# Patient Record
Sex: MALE | Race: WHITE | NOT HISPANIC OR LATINO | Employment: PART TIME | URBAN - METROPOLITAN AREA
[De-identification: names, ages, dates, MRNs, and addresses within clinical notes are randomized per-mention and may not be internally consistent; named-entity substitution may affect disease eponyms.]

---

## 2022-10-04 ENCOUNTER — OFFICE VISIT (OUTPATIENT)
Dept: URGENT CARE | Facility: PHYSICIAN GROUP | Age: 22
End: 2022-10-04
Payer: COMMERCIAL

## 2022-10-04 VITALS
SYSTOLIC BLOOD PRESSURE: 116 MMHG | TEMPERATURE: 97.3 F | HEIGHT: 77 IN | BODY MASS INDEX: 26.21 KG/M2 | DIASTOLIC BLOOD PRESSURE: 72 MMHG | RESPIRATION RATE: 18 BRPM | HEART RATE: 89 BPM | WEIGHT: 222 LBS | OXYGEN SATURATION: 99 %

## 2022-10-04 DIAGNOSIS — M62.830 BACK MUSCLE SPASM: ICD-10-CM

## 2022-10-04 PROCEDURE — 99203 OFFICE O/P NEW LOW 30 MIN: CPT | Performed by: FAMILY MEDICINE

## 2022-10-04 NOTE — PROGRESS NOTES
"  Subjective:      22 y.o. male presents to urgent care for back pain that started on Friday.  There is no inciting event or trauma at that time.  The pain was initially constant, but is improving and is now only intermittent, coming with certain movements, is described as dull, currently rated 5/10.  He has been using both Tylenol and ibuprofen with good relief in symptoms.  No numbness or weakness to lower extremities bilaterally.  No loss of bowel or bladder function.    Back pain red flags:  -Recent trauma: no  -Unexplained weight loss: no  -Neurologic symptoms: no  -Age >50 years old: no  -Fever: no  -IV drug use: no  -Steroid use: no  -History of cancer: no    He denies any other questions or concerns at this time.    Current problem list, medication, and past medical/surgical history were reviewed in Epic.    ROS  See HPI     Objective:      /72   Pulse 89   Temp 36.3 °C (97.3 °F)   Resp 18   Ht 1.956 m (6' 5\")   Wt 101 kg (222 lb)   SpO2 99%   BMI 26.33 kg/m²     Physical Exam  Constitutional:       General: He is not in acute distress.     Appearance: He is not diaphoretic.   Cardiovascular:      Rate and Rhythm: Normal rate and regular rhythm.      Heart sounds: Normal heart sounds.   Pulmonary:      Effort: Pulmonary effort is normal. No respiratory distress.      Breath sounds: Normal breath sounds.   Musculoskeletal:      Comments: No discolorations or deformities noted to inspection of back.  No step-offs or areas of tenderness to palpation of spine.  He is tender to palpation of his left thoracic, paraspinal muscles, no issues on the right   Neurological:      Mental Status: He is alert.      Comments: Equal strength and sensation to lower extremities bilaterally.  Normal gait.   Psychiatric:         Mood and Affect: Affect normal.         Judgment: Judgment normal.     Assessment/Plan:     1. Back muscle spasm  No red flags.  Pain is already improving.  Continue with heat, Tylenol, and " ibuprofen.  School note has been provided.      Instructed to return to Urgent Care or nearest Emergency Department if symptoms fail to improve, for any change in condition, further concerns, or new concerning symptoms. Patient states understanding of the plan of care and discharge instructions.    Verónica Beasley M.D.

## 2022-10-04 NOTE — LETTER
October 4, 2022    To Whom It May Concern:         This is confirmation that Giles Botello attended his scheduled appointment with Verónica Beasley M.D. on 10/04/22. He may return to school without any restrictions tomorrow.          If you have any questions please do not hesitate to call me at the phone number listed below.    Sincerely,          Verónica Beasley M.D.  [phone]

## 2022-10-25 ENCOUNTER — OFFICE VISIT (OUTPATIENT)
Dept: URGENT CARE | Facility: PHYSICIAN GROUP | Age: 22
End: 2022-10-25
Payer: COMMERCIAL

## 2022-10-25 VITALS
WEIGHT: 210 LBS | OXYGEN SATURATION: 97 % | TEMPERATURE: 97.8 F | HEIGHT: 77 IN | SYSTOLIC BLOOD PRESSURE: 118 MMHG | BODY MASS INDEX: 24.79 KG/M2 | RESPIRATION RATE: 14 BRPM | DIASTOLIC BLOOD PRESSURE: 72 MMHG | HEART RATE: 52 BPM

## 2022-10-25 DIAGNOSIS — J02.9 PHARYNGITIS, UNSPECIFIED ETIOLOGY: ICD-10-CM

## 2022-10-25 DIAGNOSIS — M54.50 ACUTE RIGHT-SIDED LOW BACK PAIN WITHOUT SCIATICA: ICD-10-CM

## 2022-10-25 PROCEDURE — 99213 OFFICE O/P EST LOW 20 MIN: CPT | Performed by: NURSE PRACTITIONER

## 2022-10-25 ASSESSMENT — ENCOUNTER SYMPTOMS
BACK PAIN: 1
SORE THROAT: 1
FEVER: 1
NEUROLOGICAL NEGATIVE: 1

## 2022-10-25 ASSESSMENT — VISUAL ACUITY: OU: 1

## 2022-10-25 NOTE — PROGRESS NOTES
"Subjective:     Giles Botello is a 22 y.o. male who presents for Medical Clearance (Needs a doctors note for work and school, was sick over the weekend )       Pharyngitis   This is a new problem. The problem has been gradually worsening.   Back Pain  Associated symptoms include a fever.     Patient reports over the weekend, he started to develop swollen tonsils, sore throat, and fever.  Symptoms improving at this time.  Did miss school and work and needs a note.    Reports chronic, recurrent lower back pain.  Has pain and tenderness at his right lower back.  Denies injury.    Review of Systems   Constitutional:  Positive for fever.   HENT:  Positive for sore throat.    Musculoskeletal:  Positive for back pain.   Neurological: Negative.    All other systems reviewed and are negative.    Refer to HPI for additional details.    During this visit, appropriate PPE was worn, hand hygiene was performed, and the patient and any visitors were masked.    PMH:  has no past medical history on file.    MEDS: No current outpatient medications on file.    ALLERGIES: No Known Allergies  SURGHX: History reviewed. No pertinent surgical history.  SOCHX:  reports that he has never smoked. He has never used smokeless tobacco. He reports current alcohol use. He reports that he does not use drugs.    FH: Per HPI as applicable/pertinent.      Objective:     /72   Pulse (!) 52   Temp 36.6 °C (97.8 °F) (Temporal)   Resp 14   Ht 1.956 m (6' 5\")   Wt 95.3 kg (210 lb)   SpO2 97%   BMI 24.90 kg/m²     Physical Exam  Nursing note reviewed.   Constitutional:       General: He is not in acute distress.     Appearance: He is well-developed. He is not ill-appearing or toxic-appearing.   HENT:      Mouth/Throat:      Mouth: Mucous membranes are moist.      Pharynx: Uvula midline. Pharyngeal swelling and posterior oropharyngeal erythema present.   Eyes:      General: Vision grossly intact.   Cardiovascular:      Rate and Rhythm: Normal " rate.   Pulmonary:      Effort: Pulmonary effort is normal. No respiratory distress.   Musculoskeletal:         General: No deformity. Normal range of motion.      Lumbar back: Spasms and tenderness (R paraspinous region) present. No deformity. Normal range of motion.   Skin:     General: Skin is warm and dry.      Coloration: Skin is not pale.   Neurological:      Mental Status: He is alert and oriented to person, place, and time.      Motor: No weakness.   Psychiatric:         Behavior: Behavior normal. Behavior is cooperative.       Assessment/Plan:     1. Pharyngitis, unspecified etiology    2. Acute right-sided low back pain without sciatica    Patient declines strep testing at this time.  Reports symptoms are improving.    Discussed OTC ibuprofen/NSAID. Warm salt water gargles PRN. Heat application to back pain.  Patient declines Rx for muscle relaxant.    Differential diagnosis, natural history, supportive care, rest, fluids, over-the-counter symptom management per 's instructions, close monitoring, and indications for immediate follow-up discussed.     All questions answered. Patient agrees with the plan of care.    Discharge summary provided.    Work note provided.    School note provided.

## 2022-10-25 NOTE — LETTER
October 25, 2022         Patient: Giles Botello   YOB: 2000   Date of Visit: 10/25/2022           To Whom it May Concern:    Giles Botello was seen in my clinic on 10/25/2022 due to illness. Due to medical necessity, please excuse patient from work 10/21/2022 through 10/24/2022.       If you have any questions or concerns, please don't hesitate to call.        Sincerely,           IJEOMA Soriano.  Electronically Signed

## 2022-10-25 NOTE — LETTER
October 25, 2022         Patient: Giles Botello   YOB: 2000   Date of Visit: 10/25/2022           To Whom it May Concern:    Giles Botello was seen in my clinic on 10/25/2022 due to illness. Due to medical necessity, please excuse patient from school 10/21/2022 through 10/24/2022.       If you have any questions or concerns, please don't hesitate to call.        Sincerely,         IJEOMA Soriano.  Electronically Signed

## 2024-01-29 ENCOUNTER — OFFICE VISIT (OUTPATIENT)
Dept: URGENT CARE | Facility: PHYSICIAN GROUP | Age: 24
End: 2024-01-29
Payer: COMMERCIAL

## 2024-01-29 VITALS
RESPIRATION RATE: 16 BRPM | HEART RATE: 71 BPM | OXYGEN SATURATION: 97 % | WEIGHT: 210 LBS | BODY MASS INDEX: 24.79 KG/M2 | TEMPERATURE: 98.1 F | DIASTOLIC BLOOD PRESSURE: 68 MMHG | HEIGHT: 77 IN | SYSTOLIC BLOOD PRESSURE: 114 MMHG

## 2024-01-29 DIAGNOSIS — R05.8 SPASMODIC COUGH: ICD-10-CM

## 2024-01-29 DIAGNOSIS — J01.90 ACUTE BACTERIAL SINUSITIS: ICD-10-CM

## 2024-01-29 DIAGNOSIS — B96.89 ACUTE BACTERIAL SINUSITIS: ICD-10-CM

## 2024-01-29 PROCEDURE — 3074F SYST BP LT 130 MM HG: CPT | Performed by: NURSE PRACTITIONER

## 2024-01-29 PROCEDURE — 3078F DIAST BP <80 MM HG: CPT | Performed by: NURSE PRACTITIONER

## 2024-01-29 PROCEDURE — 99213 OFFICE O/P EST LOW 20 MIN: CPT | Performed by: NURSE PRACTITIONER

## 2024-01-29 RX ORDER — DOXYCYCLINE HYCLATE 100 MG
100 TABLET ORAL 2 TIMES DAILY
Qty: 14 TABLET | Refills: 0 | Status: SHIPPED | OUTPATIENT
Start: 2024-01-29 | End: 2024-02-05

## 2024-01-29 RX ORDER — DEXTROMETHORPHAN HYDROBROMIDE AND PROMETHAZINE HYDROCHLORIDE 15; 6.25 MG/5ML; MG/5ML
5 SYRUP ORAL EVERY 4 HOURS PRN
Qty: 120 ML | Refills: 0 | Status: SHIPPED | OUTPATIENT
Start: 2024-01-29

## 2024-01-29 RX ORDER — PREDNISONE 20 MG/1
TABLET ORAL
Qty: 10 TABLET | Refills: 0 | Status: SHIPPED | OUTPATIENT
Start: 2024-01-29

## 2024-01-29 ASSESSMENT — ENCOUNTER SYMPTOMS
SPUTUM PRODUCTION: 1
SINUS PRESSURE: 1
COUGH: 1

## 2024-01-30 NOTE — PROGRESS NOTES
"Carolyn Botello is a 23 y.o. male who presents with Other (X1-2 weeks: Runny nose, cough that causes headache, green snot, sinus pressure. )            Sinus Problem  This is a new problem. Episode onset: pt reports new onset of cold symptoms that started about one month ago. he states he was recently on abx for 5 days, started to feel better but then all the sxs returned. he states his cough is worse at night. cough is productive of mucous. no fevers. Associated symptoms include congestion, coughing and sinus pressure. Past treatments include acetaminophen and oral decongestants. The treatment provided no relief.       Review of Systems   HENT:  Positive for congestion and sinus pressure.    Respiratory:  Positive for cough and sputum production.    All other systems reviewed and are negative.         History reviewed. No pertinent past medical history. History reviewed. No pertinent surgical history.   Social History     Socioeconomic History    Marital status: Single     Spouse name: Not on file    Number of children: Not on file    Years of education: Not on file    Highest education level: Not on file   Occupational History    Not on file   Tobacco Use    Smoking status: Never    Smokeless tobacco: Never   Vaping Use    Vaping Use: Never used   Substance and Sexual Activity    Alcohol use: Yes    Drug use: Never    Sexual activity: Not on file   Other Topics Concern    Not on file   Social History Narrative    Not on file     Social Determinants of Health     Financial Resource Strain: Not on file   Food Insecurity: Not on file   Transportation Needs: Not on file   Physical Activity: Not on file   Stress: Not on file   Social Connections: Not on file   Intimate Partner Violence: Not on file   Housing Stability: Not on file         Objective     /68   Pulse 71   Temp 36.7 °C (98.1 °F)   Resp 16   Ht 1.956 m (6' 5\")   Wt 95.3 kg (210 lb)   SpO2 97%   BMI 24.90 kg/m²      Physical " Exam  Vitals and nursing note reviewed.   Constitutional:       Appearance: Normal appearance.   HENT:      Head: Normocephalic and atraumatic.      Right Ear: Tympanic membrane and external ear normal.      Left Ear: Tympanic membrane and external ear normal.      Nose: Congestion present.      Mouth/Throat:      Mouth: Mucous membranes are moist.      Pharynx: Oropharynx is clear.   Eyes:      Extraocular Movements: Extraocular movements intact.      Pupils: Pupils are equal, round, and reactive to light.   Cardiovascular:      Rate and Rhythm: Normal rate and regular rhythm.   Pulmonary:      Effort: Pulmonary effort is normal.      Breath sounds: Normal breath sounds.      Comments: Bronchospastic cough  Musculoskeletal:         General: Normal range of motion.      Cervical back: Normal range of motion and neck supple.   Skin:     General: Skin is warm and dry.      Capillary Refill: Capillary refill takes less than 2 seconds.   Neurological:      General: No focal deficit present.      Mental Status: He is alert and oriented to person, place, and time. Mental status is at baseline.   Psychiatric:         Mood and Affect: Mood normal.         Thought Content: Thought content normal.         Judgment: Judgment normal.                             Assessment & Plan        1. Acute bacterial sinusitis  - doxycycline (VIBRAMYCIN) 100 MG Tab; Take 1 Tablet by mouth 2 times a day for 7 days.  Dispense: 14 Tablet; Refill: 0    2. Spasmodic cough  - predniSONE (DELTASONE) 20 MG Tab; Take 2 tabs PO daily for 5 days  Dispense: 10 Tablet; Refill: 0  - promethazine-dextromethorphan (PROMETHAZINE-DM) 6.25-15 MG/5ML syrup; Take 5 mL by mouth every four hours as needed for Cough.  Dispense: 120 mL; Refill: 0     Take full course of steroids and doxy as directed  Sedating effects of cough syrup discussed  Encouraged rest and fluids  Supportive care, differential diagnoses, and indications for immediate follow-up discussed with  patient.    Pathogenesis of diagnosis discussed including typical length and natural progression.    Instructed to return to UC or nearest emergency department if symptoms fail to improve, for any change in condition, further concerns, or new concerning symptoms.  Patient states understanding of the plan of care and discharge instructions.

## 2024-03-26 ENCOUNTER — OFFICE VISIT (OUTPATIENT)
Dept: URGENT CARE | Facility: PHYSICIAN GROUP | Age: 24
End: 2024-03-26
Payer: COMMERCIAL

## 2024-03-26 VITALS
HEART RATE: 76 BPM | BODY MASS INDEX: 24.32 KG/M2 | RESPIRATION RATE: 16 BRPM | DIASTOLIC BLOOD PRESSURE: 54 MMHG | HEIGHT: 77 IN | TEMPERATURE: 97.8 F | SYSTOLIC BLOOD PRESSURE: 114 MMHG | OXYGEN SATURATION: 96 % | WEIGHT: 206 LBS

## 2024-03-26 DIAGNOSIS — B96.89 ACUTE BACTERIAL SINUSITIS: ICD-10-CM

## 2024-03-26 DIAGNOSIS — J01.90 ACUTE BACTERIAL SINUSITIS: ICD-10-CM

## 2024-03-26 PROCEDURE — 3074F SYST BP LT 130 MM HG: CPT | Performed by: REGISTERED NURSE

## 2024-03-26 PROCEDURE — 3078F DIAST BP <80 MM HG: CPT | Performed by: REGISTERED NURSE

## 2024-03-26 PROCEDURE — 99214 OFFICE O/P EST MOD 30 MIN: CPT | Performed by: REGISTERED NURSE

## 2024-03-26 RX ORDER — DOXYCYCLINE HYCLATE 100 MG
100 TABLET ORAL 2 TIMES DAILY
Qty: 14 TABLET | Refills: 0 | Status: SHIPPED | OUTPATIENT
Start: 2024-03-26 | End: 2024-04-02

## 2024-03-26 RX ORDER — FLUTICASONE PROPIONATE 50 MCG
2 SPRAY, SUSPENSION (ML) NASAL DAILY
Qty: 15.8 ML | Refills: 0 | Status: SHIPPED | OUTPATIENT
Start: 2024-03-26

## 2024-03-26 ASSESSMENT — ENCOUNTER SYMPTOMS
FEVER: 0
COUGH: 1
DIZZINESS: 0
SHORTNESS OF BREATH: 0
ABDOMINAL PAIN: 0
CHILLS: 0

## 2024-03-26 NOTE — PROGRESS NOTES
Subjective:   Giles Botello is a 23 y.o. male who presents for Sinusitis (X 2.5 wk Sinus pressure, nasal congestion, chest congestion, cough)      HPI  Has had ongoing recurrent sinus infections. Was first treated in December and had a few weeks of resolution. Then in January was seen at [facility] and given antibiotics which he stated weren't log enough so then he was evaluated here 1/29/24 and started on antibiotic and steroids. Did get some resolution. Now having 2.5 weeks of coughing, chest congestion, worsening sinus pressure with thick green drainage. These symptoms started after a cold. He is using tylenol for the symptoms. No heart or lung hx. No sinus surgeries. Denies high fever over 102, eye pain, acute vision changes, neck stiffness, equilibrium disturbances, unilateral/bilateral weakness    Review of Systems   Constitutional:  Negative for chills and fever.   HENT:  Positive for congestion.    Respiratory:  Positive for cough. Negative for shortness of breath.    Cardiovascular:  Negative for chest pain.   Gastrointestinal:  Negative for abdominal pain.   Skin:  Negative for rash.   Neurological:  Negative for dizziness.       No Known Allergies    There are no problems to display for this patient.      Current Outpatient Medications Ordered in Epic   Medication Sig Dispense Refill    fluticasone (FLONASE) 50 MCG/ACT nasal spray Administer 2 Sprays into affected nostril(S) every day. 15.8 mL 0    doxycycline (VIBRAMYCIN) 100 MG Tab Take 1 Tablet by mouth 2 times a day for 7 days. 14 Tablet 0    predniSONE (DELTASONE) 20 MG Tab Take 2 tabs PO daily for 5 days (Patient not taking: Reported on 3/26/2024) 10 Tablet 0    promethazine-dextromethorphan (PROMETHAZINE-DM) 6.25-15 MG/5ML syrup Take 5 mL by mouth every four hours as needed for Cough. (Patient not taking: Reported on 3/26/2024) 120 mL 0     No current Epic-ordered facility-administered medications on file.       No past surgical history on file.    Social  "History     Tobacco Use    Smoking status: Never    Smokeless tobacco: Never   Vaping Use    Vaping Use: Never used   Substance Use Topics    Alcohol use: Yes    Drug use: Never       family history is not on file.     Problem list, medications, and allergies reviewed by myself today in Epic.     Objective:   /54 (BP Location: Left arm, Patient Position: Sitting, BP Cuff Size: Adult)   Pulse 76   Temp 36.6 °C (97.8 °F) (Temporal)   Resp 16   Ht 1.956 m (6' 5\")   Wt 93.4 kg (206 lb)   SpO2 96%   BMI 24.43 kg/m²     Physical Exam  Vitals and nursing note reviewed.   Constitutional:       General: He is not in acute distress.     Appearance: Normal appearance. He is well-developed. He is not ill-appearing, toxic-appearing or diaphoretic.   HENT:      Head: Normocephalic and atraumatic.      Right Ear: Hearing, tympanic membrane, ear canal and external ear normal.      Left Ear: Hearing, tympanic membrane, ear canal and external ear normal.      Nose: Mucosal edema, congestion and rhinorrhea present. Rhinorrhea is purulent.      Right Turbinates: Swollen.      Left Turbinates: Swollen.      Right Sinus: Maxillary sinus tenderness and frontal sinus tenderness present.      Left Sinus: Maxillary sinus tenderness and frontal sinus tenderness present.      Mouth/Throat:      Mouth: Mucous membranes are moist.      Dentition: Normal dentition. No dental caries.      Pharynx: Posterior oropharyngeal erythema present. No oropharyngeal exudate.      Comments: Colored PND noted  Eyes:      General: No scleral icterus.        Right eye: No discharge.         Left eye: No discharge.      Conjunctiva/sclera: Conjunctivae normal.   Cardiovascular:      Rate and Rhythm: Normal rate and regular rhythm.      Pulses: Normal pulses.      Heart sounds: Normal heart sounds.   Pulmonary:      Effort: Pulmonary effort is normal. No respiratory distress.      Breath sounds: Normal breath sounds. No stridor. No wheezing, rhonchi " or rales.   Musculoskeletal:      Cervical back: Normal range of motion and neck supple.      Right lower leg: No edema.      Left lower leg: No edema.   Lymphadenopathy:      Cervical: Cervical adenopathy present.   Skin:     General: Skin is warm and dry.      Findings: No rash.      Nails: There is no clubbing.   Neurological:      General: No focal deficit present.      Mental Status: He is alert and oriented to person, place, and time.   Psychiatric:         Mood and Affect: Mood normal.         Assessment/Plan:     I personally reviewed prior external notes and test results pertinent to today's visit as well as additional imaging and testing completed in clinic today.     1. Acute bacterial sinusitis  fluticasone (FLONASE) 50 MCG/ACT nasal spray    doxycycline (VIBRAMYCIN) 100 MG Tab    Referral to ENT        TESTING: Deferred  Vital Signs: WNL  ABNORMAL EXAM FINDINGS:  Nasal congestion, mucosal edema, purulent PND, frontal and maxillary sinus tenderness  PLAN/MDM: 2 and half weeks of symptoms started as a cold and shifted in the sinuses with pressure congestion and purulent drainage.  Colored PND.  History of recurrent sinus infections over the past 3 months.  No red flag signs or symptoms.  Did discuss the recurrence of symptoms and how he would benefit from starting Flonase as well as Zyrtec.  Will place on antibiotic given likely bacterial sinusitis.  Also referral to ENT.    Will start on doxycycline for bacterial sinusitis  OTC cold and sinus medications  Zyrtec and Flonase  Referral to ENT  Sinus rinse  Warm steam shower  Adequate hydration  Reviewed return precautions and ER indications      Shared decision-making was utilized with patient for treatment plan. Differential Diagnosis, natural history, and supportive care discussed.     Medication discussed included indication for use and the potential benefits and side effects. Education was provided regarding the aforementioned assessments. All of the  patient's questions were answered to their satisfaction at the time of discharge. Patient was encouraged to monitor symptoms closely. Those signs and symptoms which would warrant concern and mandate seeking a higher level of service through the emergency department discussed at length. Patient stated agreement and understanding of this plan of care.     Please note that this dictation was created using voice recognition software. I have made every reasonable attempt to correct obvious errors, but I expect that there are errors of grammar and possibly content that I did not discover before finalizing the note.    This note was electronically signed by XIAO Caban

## 2024-04-11 ENCOUNTER — OFFICE VISIT (OUTPATIENT)
Dept: URGENT CARE | Facility: PHYSICIAN GROUP | Age: 24
End: 2024-04-11
Payer: COMMERCIAL

## 2024-04-11 VITALS
TEMPERATURE: 97.6 F | SYSTOLIC BLOOD PRESSURE: 96 MMHG | BODY MASS INDEX: 24.32 KG/M2 | HEIGHT: 77 IN | HEART RATE: 90 BPM | OXYGEN SATURATION: 98 % | RESPIRATION RATE: 16 BRPM | WEIGHT: 206 LBS | DIASTOLIC BLOOD PRESSURE: 68 MMHG

## 2024-04-11 DIAGNOSIS — T78.40XA ALLERGY, INITIAL ENCOUNTER: ICD-10-CM

## 2024-04-11 DIAGNOSIS — J32.9 RHINOSINUSITIS: ICD-10-CM

## 2024-04-11 PROCEDURE — 99214 OFFICE O/P EST MOD 30 MIN: CPT | Performed by: REGISTERED NURSE

## 2024-04-11 PROCEDURE — 3078F DIAST BP <80 MM HG: CPT | Performed by: REGISTERED NURSE

## 2024-04-11 PROCEDURE — 3074F SYST BP LT 130 MM HG: CPT | Performed by: REGISTERED NURSE

## 2024-04-11 RX ORDER — TRIAMCINOLONE ACETONIDE 40 MG/ML
40 INJECTION, SUSPENSION INTRA-ARTICULAR; INTRAMUSCULAR ONCE
Status: COMPLETED | OUTPATIENT
Start: 2024-04-11 | End: 2024-04-11

## 2024-04-11 RX ADMIN — TRIAMCINOLONE ACETONIDE 40 MG: 40 INJECTION, SUSPENSION INTRA-ARTICULAR; INTRAMUSCULAR at 13:33

## 2024-04-11 ASSESSMENT — ENCOUNTER SYMPTOMS
CHILLS: 0
SHORTNESS OF BREATH: 0
FEVER: 0
DIZZINESS: 0
NECK PAIN: 0

## 2024-04-11 NOTE — PROGRESS NOTES
"Subjective:   Giles Botello is a 23 y.o. male who presents for Sinusitis (Reoccuring/Would like Augmentin )      HPI  Presenting for reevaluation of continued sinus pressure with continued nasal drainage can range from clear to green.  Having cough at times mostly from postnasal drainage clear throat.  This is now his third visit with us for this issue have tried multiple antibiotics which did not resolve symptoms slight improvement with steroid.  Does have underlying history of allergies but does not take daily allergy medicine.  Denies high fever over 102, eye pain, acute vision changes, neck stiffness, equilibrium disturbances, unilateral/bilateral weakness.    Review of Systems   Constitutional:  Negative for chills and fever.   Respiratory:  Negative for shortness of breath.    Cardiovascular:  Negative for chest pain.   Musculoskeletal:  Negative for neck pain.   Skin:  Negative for rash.   Neurological:  Negative for dizziness.       No Known Allergies    There are no problems to display for this patient.      Current Outpatient Medications Ordered in Epic   Medication Sig Dispense Refill    fluticasone (FLONASE) 50 MCG/ACT nasal spray Administer 2 Sprays into affected nostril(S) every day. 15.8 mL 0     Current Facility-Administered Medications Ordered in Epic   Medication Dose Route Frequency Provider Last Rate Last Admin    triamcinolone acetonide (Kenalog-40) injection 40 mg  40 mg Intramuscular Once Daquan [place], A.P.R.N.           No past surgical history on file.    Social History     Tobacco Use    Smoking status: Never    Smokeless tobacco: Never   Vaping Use    Vaping Use: Never used   Substance Use Topics    Alcohol use: Yes    Drug use: Never       family history is not on file.     Problem list, medications, and allergies reviewed by myself today in Epic.     Objective:   BP 96/68   Pulse 90   Temp 36.4 °C (97.6 °F)   Resp 16   Ht 1.956 m (6' 5\")   Wt 93.4 kg (206 lb)   SpO2 98%   BMI 24.43 " kg/m²     Physical Exam  Vitals and nursing note reviewed.   Constitutional:       Appearance: Normal appearance. He is not ill-appearing or toxic-appearing.   HENT:      Head: Normocephalic.      Right Ear: Tympanic membrane, ear canal and external ear normal.      Left Ear: Tympanic membrane, ear canal and external ear normal.      Nose: Congestion and rhinorrhea present.      Right Turbinates: Swollen.      Left Turbinates: Swollen.      Right Sinus: No maxillary sinus tenderness or frontal sinus tenderness.      Left Sinus: No maxillary sinus tenderness or frontal sinus tenderness.      Mouth/Throat:      Mouth: Mucous membranes are moist.      Pharynx: No oropharyngeal exudate or posterior oropharyngeal erythema.      Comments: Clear PND  Eyes:      General: No scleral icterus.        Right eye: No discharge.         Left eye: No discharge.      Pupils: Pupils are equal, round, and reactive to light.   Cardiovascular:      Rate and Rhythm: Normal rate and regular rhythm.      Heart sounds: No murmur heard.  Pulmonary:      Effort: Pulmonary effort is normal. No respiratory distress.      Breath sounds: Normal breath sounds.   Musculoskeletal:         General: Normal range of motion.      Cervical back: Normal range of motion.   Skin:     General: Skin is warm and dry.      Capillary Refill: Capillary refill takes less than 2 seconds.      Findings: No rash.   Neurological:      General: No focal deficit present.      Mental Status: He is alert and oriented to person, place, and time.      Cranial Nerves: No cranial nerve deficit.   Psychiatric:         Mood and Affect: Mood normal.         Assessment/Plan:     I personally reviewed prior external notes and test results pertinent to today's visit as well as additional imaging and testing completed in clinic today.     1. Rhinosinusitis  triamcinolone acetonide (Kenalog-40) injection 40 mg      2. Allergy, initial encounter  triamcinolone acetonide (Kenalog-40)  injection 40 mg        This is now patient's third visit for reevaluation of continued symptoms that did not respond to multiple rounds of antibiotics.  Does have underlying history of allergies and not on daily medication.  Symptoms include sinus pressure and copious PND and rhinorrhea can range from clear to green.  No other pertinent medical history.  During his last visit I did place referral to ENT but he did not schedule so during visit I provided him the number to call and make an appointment.  Given lack of response to antibiotics and slight improvement with steroid I think allergic rhinosinusitis is more likely and will give Kenalog injection in clinic.  There is no frontal or maxillary sinus tenderness, no indication of bacterial sinusitis.  Reiterated the importance of daily Flonase and Zyrtec.  He is instructed to increase fluids.  Follow-up with ENT.  Monitor symptoms closely    Shared decision-making was utilized with patient for treatment plan. Differential Diagnosis, natural history, and supportive care discussed.     Medication discussed included indication for use and the potential benefits and side effects. Education was provided regarding the aforementioned assessments. All of the patient's questions were answered to their satisfaction at the time of discharge. Patient was encouraged to monitor symptoms closely. Those signs and symptoms which would warrant concern and mandate seeking a higher level of service through the emergency department discussed at length. Patient stated agreement and understanding of this plan of care.     Please note that this dictation was created using voice recognition software. I have made every reasonable attempt to correct obvious errors, but I expect that there are errors of grammar and possibly content that I did not discover before finalizing the note.    This note was electronically signed by XIAO Caban

## 2024-04-11 NOTE — LETTER
April 11, 2024         Patient: Giles Botello   YOB: 2000   Date of Visit: 4/11/2024           To Whom it May Concern:    Giles Botello was seen in my clinic on 4/11/2024. He may return to school on 04/12/24.    If you have any questions or concerns, please don't hesitate to call.        Sincerely,           XIAO Caban  Electronically Signed

## 2024-07-02 ENCOUNTER — HOSPITAL ENCOUNTER (OUTPATIENT)
Dept: RADIOLOGY | Facility: MEDICAL CENTER | Age: 24
End: 2024-07-02
Attending: PHYSICIAN ASSISTANT
Payer: OTHER MISCELLANEOUS

## 2024-07-02 ENCOUNTER — OFFICE VISIT (OUTPATIENT)
Dept: URGENT CARE | Facility: PHYSICIAN GROUP | Age: 24
End: 2024-07-02
Payer: COMMERCIAL

## 2024-07-02 VITALS
HEART RATE: 66 BPM | BODY MASS INDEX: 23.72 KG/M2 | WEIGHT: 200.9 LBS | OXYGEN SATURATION: 96 % | HEIGHT: 77 IN | SYSTOLIC BLOOD PRESSURE: 116 MMHG | TEMPERATURE: 97.3 F | DIASTOLIC BLOOD PRESSURE: 74 MMHG | RESPIRATION RATE: 14 BRPM

## 2024-07-02 DIAGNOSIS — S93.492A SPRAIN OF ANTERIOR TALOFIBULAR LIGAMENT OF LEFT ANKLE, INITIAL ENCOUNTER: Primary | ICD-10-CM

## 2024-07-02 DIAGNOSIS — M25.572 ACUTE LEFT ANKLE PAIN: ICD-10-CM

## 2024-07-02 PROCEDURE — 73610 X-RAY EXAM OF ANKLE: CPT | Mod: LT

## 2024-07-02 PROCEDURE — 3078F DIAST BP <80 MM HG: CPT | Performed by: PHYSICIAN ASSISTANT

## 2024-07-02 PROCEDURE — 99213 OFFICE O/P EST LOW 20 MIN: CPT | Performed by: PHYSICIAN ASSISTANT

## 2024-07-02 PROCEDURE — 3074F SYST BP LT 130 MM HG: CPT | Performed by: PHYSICIAN ASSISTANT

## 2025-01-09 ENCOUNTER — OFFICE VISIT (OUTPATIENT)
Dept: MEDICAL GROUP | Facility: CLINIC | Age: 25
End: 2025-01-09
Payer: COMMERCIAL

## 2025-01-09 ENCOUNTER — RESEARCH ENCOUNTER (OUTPATIENT)
Dept: MEDICAL GROUP | Facility: CLINIC | Age: 25
End: 2025-01-09
Payer: COMMERCIAL

## 2025-01-09 ENCOUNTER — APPOINTMENT (OUTPATIENT)
Facility: MEDICAL CENTER | Age: 25
End: 2025-01-09
Attending: FAMILY MEDICINE

## 2025-01-09 VITALS
WEIGHT: 197 LBS | SYSTOLIC BLOOD PRESSURE: 106 MMHG | OXYGEN SATURATION: 98 % | HEART RATE: 59 BPM | DIASTOLIC BLOOD PRESSURE: 61 MMHG | BODY MASS INDEX: 23.26 KG/M2 | RESPIRATION RATE: 16 BRPM | HEIGHT: 77 IN

## 2025-01-09 DIAGNOSIS — Z00.6 RESEARCH STUDY PATIENT: ICD-10-CM

## 2025-01-09 DIAGNOSIS — Z00.00 HEALTH CARE MAINTENANCE: ICD-10-CM

## 2025-01-09 DIAGNOSIS — F31.60 BIPOLAR 1 DISORDER, MIXED (HCC): ICD-10-CM

## 2025-01-09 PROCEDURE — 99203 OFFICE O/P NEW LOW 30 MIN: CPT | Performed by: FAMILY MEDICINE

## 2025-01-09 PROCEDURE — 3078F DIAST BP <80 MM HG: CPT | Performed by: FAMILY MEDICINE

## 2025-01-09 PROCEDURE — 3074F SYST BP LT 130 MM HG: CPT | Performed by: FAMILY MEDICINE

## 2025-01-09 RX ORDER — ARIPIPRAZOLE 10 MG/1
10 TABLET ORAL DAILY
Qty: 30 TABLET | Refills: 1 | Status: SHIPPED | OUTPATIENT
Start: 2025-01-09

## 2025-01-09 NOTE — ASSESSMENT & PLAN NOTE
I am not ordering any labs or other screenings at this time.  I am going to ask to enroll him in our healthy [place] project and we can evaluate him for that this morning.  Follow-up with me in 1 month or check with me through [employer] if questions in the meantime.

## 2025-01-09 NOTE — ASSESSMENT & PLAN NOTE
I am placing a referral to psychiatry for further evaluation and management.  In the meantime I will refill his Abilify 10 mg 1 p.o. daily #30 with 1 refill.

## 2025-01-17 ENCOUNTER — OFFICE VISIT (OUTPATIENT)
Dept: MEDICAL GROUP | Facility: CLINIC | Age: 25
End: 2025-01-17
Payer: COMMERCIAL

## 2025-01-17 VITALS
TEMPERATURE: 97.9 F | BODY MASS INDEX: 23.56 KG/M2 | HEIGHT: 77 IN | HEART RATE: 68 BPM | OXYGEN SATURATION: 96 % | SYSTOLIC BLOOD PRESSURE: 106 MMHG | WEIGHT: 199.5 LBS | DIASTOLIC BLOOD PRESSURE: 69 MMHG

## 2025-01-17 DIAGNOSIS — F31.60 BIPOLAR 1 DISORDER, MIXED (HCC): ICD-10-CM

## 2025-01-17 PROCEDURE — 99214 OFFICE O/P EST MOD 30 MIN: CPT | Performed by: FAMILY MEDICINE

## 2025-01-17 PROCEDURE — 3074F SYST BP LT 130 MM HG: CPT | Performed by: FAMILY MEDICINE

## 2025-01-17 PROCEDURE — 3078F DIAST BP <80 MM HG: CPT | Performed by: FAMILY MEDICINE

## 2025-01-17 ASSESSMENT — PATIENT HEALTH QUESTIONNAIRE - PHQ9
CLINICAL INTERPRETATION OF PHQ2 SCORE: 6
SUM OF ALL RESPONSES TO PHQ QUESTIONS 1-9: 19
5. POOR APPETITE OR OVEREATING: 3 - NEARLY EVERY DAY

## 2025-01-17 ASSESSMENT — ANXIETY QUESTIONNAIRES
2. NOT BEING ABLE TO STOP OR CONTROL WORRYING: NEARLY EVERY DAY
4. TROUBLE RELAXING: NEARLY EVERY DAY
5. BEING SO RESTLESS THAT IT IS HARD TO SIT STILL: NEARLY EVERY DAY
GAD7 TOTAL SCORE: 18
7. FEELING AFRAID AS IF SOMETHING AWFUL MIGHT HAPPEN: NEARLY EVERY DAY
6. BECOMING EASILY ANNOYED OR IRRITABLE: SEVERAL DAYS
1. FEELING NERVOUS, ANXIOUS, OR ON EDGE: NEARLY EVERY DAY
3. WORRYING TOO MUCH ABOUT DIFFERENT THINGS: MORE THAN HALF THE DAYS

## 2025-01-17 NOTE — ASSESSMENT & PLAN NOTE
I gave Giles information and instructions to go to [facility] if sx's worsen or he has any suicidal ideation.  We had an extended discussion about medications for his current depression.  I am going to start sertraline 50 mg 1 p.o. nightly.  We reviewed that there is potential for that to exacerbate a manic episode.  He understands and wants to proceed.  He will bring his family in on this and they are going to monitor him as well.  I do not feel comfortable starting lamotrigine, lithium or other mood stabilizers at this time.  His Psychiatry referral did go through, and I gave him that information so that he may make a phone call and get scheduled.  I would like him to message me through "[employer] after the weekend to let me know how he is doing.

## 2025-01-17 NOTE — PROGRESS NOTES
Subjective:     CC: Worsening depression    HPI:   Giles presents today to discuss the following issues        Problem   Bipolar 1 Disorder, Mixed (Hcc)    Rosalino was seen here about a week ago.  I continued the Abilify that had been started previously.  Since that time he has been feeling worse. In particular his depression has gotten quite severe and he is very restless and anxious.    He does not have any suicidal or homicidal ideation.  He is well supported by family and friends, some of which are in the medical field.   There is a family history of bipolar as well; they encouraged him to come in for recheck.  He has not yet gotten information on his psychiatry referral.  He is suffering additional stress in that school is starting next week.  He also believes there is another diagnosis of OCD and perhaps ADHD.    Prior:    Rosalino was recently diagnosed with a bipolar 1 disorder.  Apparently a manic episode resulted in incarceration and he was diagnosed and treated there.  He was placed on Abilify and an ascending dose and he is now on 10 mg a day.  He feels he is doing very well with this dose and would like to continue.  He was not established with a psychiatrist or psychologist locally otherwise.           Current Outpatient Medications Ordered in Epic   Medication Sig Dispense Refill    sertraline (ZOLOFT) 50 MG Tab Take 1 Tablet by mouth every day. 30 Tablet 11    ARIPiprazole (ABILIFY) 10 MG Tab Take 1 Tablet by mouth every day. 30 Tablet 1    fluticasone (FLONASE) 50 MCG/ACT nasal spray Administer 2 Sprays into affected nostril(S) every day. (Patient not taking: Reported on 1/17/2025) 15.8 mL 0     No current Epic-ordered facility-administered medications on file.       Health Maintenance:     ROS:  Gen: no fevers/chills, no changes in weight  Eyes: no changes in vision  ENT: no sore throat, no hearing loss, no bloody nose  Pulm: no sob, no cough  CV: no chest pain, no palpitations  GI: no  "nausea/vomiting, no diarrhea  : no dysuria  MSk: no myalgias  Skin: no rash  Neuro: no headaches, no numbness/tingling  Heme/Lymph: no easy bruising      Objective:     Exam:  /69 (BP Location: Left arm, Patient Position: Sitting, BP Cuff Size: Adult)   Pulse 68   Temp 36.6 °C (97.9 °F) (Temporal)   Ht 1.956 m (6' 5\")   Wt 90.5 kg (199 lb 8 oz)   SpO2 96%   BMI 23.66 kg/m²  Body mass index is 23.66 kg/m².    Gen: Alert and oriented, No apparent distress.  Neck: Neck is supple without lymphadenopathy.  Lungs: Normal effort, CTA bilaterally, no wheezes, rhonchi, or rales  CV: Regular rate and rhythm. No murmurs, rubs, or gallops.  Ext: No clubbing, cyanosis, edema.          Assessment & Plan:     24 y.o. male with the following -     Problem List Items Addressed This Visit       Bipolar 1 disorder, mixed (HCC)     I gave Giles information and instructions to go to [facility] if sx's worsen or he has any suicidal ideation.  We had an extended discussion about medications for his current depression.  I am going to start sertraline 50 mg 1 p.o. nightly.  We reviewed that there is potential for that to exacerbate a manic episode.  He understands and wants to proceed.  He will bring his family in on this and they are going to monitor him as well.  I do not feel comfortable starting lamotrigine, lithium or other mood stabilizers at this time.  His Psychiatry referral did go through, and I gave him that information so that he may make a phone call and get scheduled.  I would like him to message me through [employer] after the weekend to let me know how he is doing.            I spent a total of 33 minutes with record review, exam, communication with the patient, communication with other providers, and documentation of this encounter.      No follow-ups on file.            "

## 2025-01-26 LAB
APOB+LDLR+PCSK9 GENE MUT ANL BLD/T: NOT DETECTED
BRCA1+BRCA2 DEL+DUP + FULL MUT ANL BLD/T: NOT DETECTED
MLH1+MSH2+MSH6+PMS2 GN DEL+DUP+FUL M: NOT DETECTED

## 2025-01-30 ENCOUNTER — TELEMEDICINE (OUTPATIENT)
Dept: BEHAVIORAL HEALTH | Facility: CLINIC | Age: 25
End: 2025-01-30
Payer: MEDICAID

## 2025-01-30 DIAGNOSIS — Z79.899 HIGH RISK MEDICATION USE: ICD-10-CM

## 2025-01-30 DIAGNOSIS — F31.30 BIPOLAR AFFECTIVE DISORDER, CURRENT EPISODE DEPRESSED, CURRENT EPISODE SEVERITY UNSPECIFIED (HCC): ICD-10-CM

## 2025-01-30 DIAGNOSIS — R41.840 CONCENTRATION DEFICIT: ICD-10-CM

## 2025-01-30 RX ORDER — LUMATEPERONE 42 MG/1
42 CAPSULE ORAL DAILY
Qty: 90 CAPSULE | Refills: 1 | Status: SHIPPED | OUTPATIENT
Start: 2025-01-30

## 2025-01-30 NOTE — PROGRESS NOTES
[facility] AT [facility] [place]  INITIAL PSYCHIATRY EVALUATION    This evaluation was conducted via [employer] Teams, using secure and encrypted videoconferencing technology. The patient was physically located at their home address in [place], [place], and the physician was located at her home office in [place], [place]. The patient was presented by self. The patient’s identity was confirmed and verbal consent for the telemedicine encounter was obtained.      CC:  Initial Evaluation and Medication Management of Mental Health Symptoms      History Of Present Illness:  Giles Botello is a 24 y.o. male, currently on house arrest for charges stemming from Sept 2024, see HPI 1/30/25 with history of Bipolar Affective DO, NOS, ANIBAL, r/o ADHD, r/o OCD, referred by his PCP, presents today for evaluation and to establish care.    The patient reported the following:  He started on medications following an arrest during a sting operation, for solicitation of a minor (online via chat) and other charges related to this, investigator posing as a minor, age 13, and the patient states he cut off communication when he asked the person to reiterate her age and she said 13.  He was incarcerated for this for a week and then for another 2 months and is now on house arrest.  He is working with a therapist, working diagnosis Bipolar Affective DO, unspecified, but it was questioned if it was I and not II.  His father and sister are diagnosed with Bipolar II and on a mood stabilizer, isn't sure which one, and his sister is on Zoloft.      DSM 5 Bipolar Disorder Type I Screening:    For a diagnosis of bipolar I disorder, it is necessary to meet the following criteria for a  manic episode. The manic episode may have been preceded by and may be followed by  hypomanic or major depressive episodes.    Manic Episode:  A distinct period of abnormally and persistently elevated, expansive, or irritable mood  and abnormally and  "persistently increased goal-directed activity or energy, lasting  at least 1 week and present most of the day, nearly every day (or any duration if  hospitalization is necessary).  The patient endorses.    During the period of mood disturbance and increased energy or activity, 3 (or more)  of the following symptoms (4 if the mood is only irritable) are present to a significant  degree and represent a noticeable change from usual behavior:    Inflated self-esteem or grandiosity.  The patient denies inflated self-esteem but endorses grandiosity at times where he believes he should be able to achieve something without putting in the work.    2. Decreased need for sleep (e.g., feels rested after only 3 hours of sleep).  The patient denies any change.  He is morning person, goes to bed at the same time between 9 pm to 11 pm, but if he wakes up early, he will not be able to go back to sleep, even if he only got 2 hours and this is regardless of his mood state.  His energy always feels the same.    3. More talkative than usual or pressure to keep talking.  The patient endorses this when his mood is higher.    4. Flight of ideas or subjective experience that thoughts are racing.  The patient reports that this is definitely worse during his \"high\" moods but also has this on a regular basis and may be due to his ADHD, r/o ANIBAL.  He experiences this while playing video games, ex. One that involves building things, I believe.  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     5. Distractibility (i.e., attention too easily drawn to unimportant or irrelevant  external stimuli), as reported or observed.  The patient reports that this may be worse during \"high\" mood periods.    6. Increase in goal-directed activity (either socially, at work or school, or sexually) or  psychomotor agitation (i.e., purposeless, " "non-goal-directed activity).  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  He has a lot of energy but isn't motivated to do things right now. Also when his mood is elevated he is more interested in learning and will become \"hyper motivated\", ex. It will last for 3 days and then he will lose motivation again.  It is unclear whether this is a hypomanic/manic symptom or an ADHD hyper focus on something with novelty/interest and then becoming disinterested when the novelty wears off or his interests change, ex. Being interested in developing in the area of virtual reality.       7.  Excessive involvement in activities that have a high potential for painful  consequences (e.g., engaging in unrestrained buying sprees, sexual indiscretions,  or foolish business investments).  The patient endorses that he did this once when he was exploring/experiencing/learning about having sex, had many partners in a 2 month period, unprotected sex, this was 2 years ago.    The mood disturbance is sufficiently severe to cause marked impairment in social or  occupational functioning, or to necessitate hospitalization to prevent harm to self or  others, or there are psychotic features.  The patient states he has never been hospitalized for it and that the depressive episodes with low motivation definitely impact his school performance, ex. He won't go to class and then will plan to make up the work but then he doesn't do so and gets further behind.  A few times he has had auditory hallucinations, he things, but it wasn't consistent and has happened fewer than 5 times. He has not had any periods of delusional thinking.    The episode is not attributable to the physiological effects of a substance (e.g., a drug  of abuse, a medication, or other treatment) or to another medical condition.  Note: A full " "manic episode that emerges during antidepressant treatment [e.g., medication,  electroconvulsive therapy (ECT)], but persists at a fully syndromal level beyond the physiological effect of  treatment is sufficient evidence for a manic episode, and therefore, a bipolar I diagnosis.    END OF SCREENING FOR DSM 5 BIPOLAR Disorder Type I.    ADHD: He was a great student and school came easy for him until HS.  He did his HW, made good grades and then in HS he isn't sure what happened except loss of motivation and possibly depression and possibly ADHD.  His parents told him that he was diagnosed with ADHD approx age 3 but that they did not want to put him on medication.     Mood:  yesterday his mood was a 10/10 and today it is a 6/10, 10 being a great mood.       ROS: As noted above in HPI.        Past Psychiatric History:  Denies any hospitalizations  Denies any history of SI, SA or self harm  He is working with a therapist  Medication trials:  Zoloft helps with anxiety and depression at 25 mg but felt more anxious at 50 mg, Abilify - helped at 2 mg with motivation and reduced his energy, more anxious at 5 mg and then very anxious at 10 mg and not able to tolerate      Family Psychiatric History:  Father and sister with Bipolar DO II and on mood stabilizers    Substance Use/Addiction History:  Alcohol:  used to drink occasionally up to 4 shots but stopped when he started mental health medications  Cannabis:  denies  Tobacco:  denies  Caffeine:  denies  Other:  Denies any other substances    Social History:  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   He has a GF, in a monogamous relationship x 8 months; he denies any hx of A/T.  He has an older sister.  His father worked as a clinical  and his mother is a teacher. The patient worked previously at his college at a  for the [employer].  Hobbies/interests: \"a lot\"; skiing, wake boarding, golf, video roopa, working on computer science " "projects.      Allergies:  Patient has no known allergies.      Physical Examination and Mental Status Exam:  Vital signs: There were no vitals taken for this visit.    CONSTITUTIONAL:  General Appearance:  Clean, casual attire, good eye contact, engaged with provider    ORIENTATION:  Oriented to time, place and person  RECENT AND REMOTE MEMORY:  Grossly intact  ATTENTION SPAN AND CONCENTRATION:  within normal range  LANGUAGE:  no deficits appreciated  FUND OF KNOWLEDGE:  has awareness of current events, past history and normal vocabulary  SPEECH:  normal volume, amount, rate and articulation, no perseveration or paucity of language  MOOD:  Neutral to Anxious \"a lot of energy\" but \"no motivation\"   AFFECT: mildly constricted  THOUGHT PROCESS:  logical and goal directed  THOUGHT CONTENT:  Denies any SI/HI or AVH, no delusional thinking nor preoccupations appreciated  ASSOCIATIONS:  Intact, not loose, no tangentiality or circumstantiality  MEMORY:  No gross evidence of memory deficits  JUDGMENT:  adequate concerning everyday activities  INSIGHT:  adequate to psychiatric condition    DIAGNOSTIC IMPRESSION:  1. Bipolar affective disorder, current episode depressed, current episode severity unspecified (HCC)  - Lumateperone Tosylate (CAPLYTA) 42 MG Cap; Take 42 mg by mouth every day.  Dispense: 90 Capsule; Refill: 1  - Lipid Profile; Future  - CBC WITH DIFFERENTIAL; Future  - Comp Metabolic Panel; Future    2. High risk medication use  - Lipid Profile; Future  - CBC WITH DIFFERENTIAL; Future  - Comp Metabolic Panel; Future       Assessment and Plan:  The patient's risk of suicide is assessed as low.  1.  Bipolar Affective DO, current episode depressed v. Mixed, with agitation \"a lot of energy\"   Concentration Deficit  R/o OCD  R/o ADHD  Continue Zoloft 25 mg, will be mindful that 50 mg caused him to feel more anxious  Self d/c'd Abilify 10 mg b/c he made his anxiety much worse but it helped with his motivation  Begin " "Caplyta 42 mg for Bipolar Affective DO  Ordered baseline lab work, 1/30/25 baseline weight is approx 195 lbs prior to starting Caplyta, although he did take Abilify for a period of time recently and he is 6'5\"  The patient agreed to obtain the fasting lab work at a [facility] lab  Referral for Neuropsych testing to tease out diagnosis/es further  Sent the patient two ADHD screenings, ARDS - adulthood and Wender - childhood  Reviewed the patient's medical records in EPIC prior to the start of the patient's visit  Reviewed [place] PDMP  Scheduled 1 HOUR F/U VISIT TO FURTHER TEASE OUT DIAGNOSIS - EXPLORE FURTHER \"A LOT OF ENERGY\" COULD THIS BE DEPRESSION AGITATION    2.  The patient has a safety plan which included the enGreet text and phone line and going to the nearest ED if symptoms worsen.    3.  Risks, benefits, alternatives and side effects were discussed for all medicines prescribed at this visit.  The patient voiced understanding providing informed consent.  The patient agrees to call the clinic with any questions or concerns, or seek emergent medical care if warranted.    4.  Follow up in 8 weeks or call sooner PRN    The proposed treatment plan was discussed with the patient who was provided the opportunity to ask questions and make suggestions regarding alternative treatment. Patient verbalized understanding and expressed agreement with the plan.     Greater than 16 minutes of the visit was spent in psychotherapy.  Psychotherapy include:  Provided the patient with supportive psychotherapy to build rapport and establish a therapeutic alliance with the patient, psychoeducation, topics: exploring diagnoses with the patient - OCD, ADHD, school performance; substance use screening.    Kelly Lopez M.D.      This note was created using voice recognition software (Dragon). The accuracy of the dictation is limited by the abilities of the software. I have reviewed the note prior to signing, however some errors in " grammar and context are still possible. If you have any questions related to this note please do not hesitate to contact our office.

## 2025-03-25 ASSESSMENT — ANXIETY QUESTIONNAIRES
3. WORRYING TOO MUCH ABOUT DIFFERENT THINGS: SEVERAL DAYS
7. FEELING AFRAID AS IF SOMETHING AWFUL MIGHT HAPPEN: SEVERAL DAYS
IF YOU CHECKED OFF ANY PROBLEMS ON THIS QUESTIONNAIRE, HOW DIFFICULT HAVE THESE PROBLEMS MADE IT FOR YOU TO DO YOUR WORK, TAKE CARE OF THINGS AT HOME, OR GET ALONG WITH OTHER PEOPLE: SOMEWHAT DIFFICULT
1. FEELING NERVOUS, ANXIOUS, OR ON EDGE: SEVERAL DAYS
5. BEING SO RESTLESS THAT IT IS HARD TO SIT STILL: NOT AT ALL
2. NOT BEING ABLE TO STOP OR CONTROL WORRYING: SEVERAL DAYS
IF YOU CHECKED OFF ANY PROBLEMS ON THIS QUESTIONNAIRE, HOW DIFFICULT HAVE THESE PROBLEMS MADE IT FOR YOU TO DO YOUR WORK, TAKE CARE OF THINGS AT HOME, OR GET ALONG WITH OTHER PEOPLE: SOMEWHAT DIFFICULT
6. BECOMING EASILY ANNOYED OR IRRITABLE: SEVERAL DAYS
5. BEING SO RESTLESS THAT IT IS HARD TO SIT STILL: NOT AT ALL
4. TROUBLE RELAXING: NOT AT ALL
6. BECOMING EASILY ANNOYED OR IRRITABLE: SEVERAL DAYS
GAD7 TOTAL SCORE: 5
7. FEELING AFRAID AS IF SOMETHING AWFUL MIGHT HAPPEN: SEVERAL DAYS
3. WORRYING TOO MUCH ABOUT DIFFERENT THINGS: SEVERAL DAYS
2. NOT BEING ABLE TO STOP OR CONTROL WORRYING: SEVERAL DAYS
4. TROUBLE RELAXING: NOT AT ALL
1. FEELING NERVOUS, ANXIOUS, OR ON EDGE: SEVERAL DAYS

## 2025-03-26 ENCOUNTER — TELEMEDICINE (OUTPATIENT)
Dept: BEHAVIORAL HEALTH | Facility: CLINIC | Age: 25
End: 2025-03-26
Payer: MEDICAID

## 2025-03-26 DIAGNOSIS — F31.30 BIPOLAR AFFECTIVE DISORDER, CURRENT EPISODE DEPRESSED, CURRENT EPISODE SEVERITY UNSPECIFIED (HCC): ICD-10-CM

## 2025-03-26 DIAGNOSIS — R41.840 CONCENTRATION DEFICIT: ICD-10-CM

## 2025-03-26 PROCEDURE — 99214 OFFICE O/P EST MOD 30 MIN: CPT | Mod: 95 | Performed by: PSYCHIATRY & NEUROLOGY

## 2025-03-26 PROCEDURE — 90833 PSYTX W PT W E/M 30 MIN: CPT | Mod: 95 | Performed by: PSYCHIATRY & NEUROLOGY

## 2025-03-26 RX ORDER — LUMATEPERONE 42 MG/1
42 CAPSULE ORAL DAILY
Qty: 90 CAPSULE | Refills: 1 | Status: SHIPPED | OUTPATIENT
Start: 2025-03-26

## 2025-03-26 ASSESSMENT — PATIENT HEALTH QUESTIONNAIRE - PHQ9
CLINICAL INTERPRETATION OF PHQ2 SCORE: 0
2. FEELING DOWN, DEPRESSED, IRRITABLE, OR HOPELESS: 1
6. FEELING BAD ABOUT YOURSELF - OR THAT YOU ARE A FAILURE OR HAVE LET YOURSELF OR YOUR FAMILY DOWN: 2
10. IF YOU CHECKED OFF ANY PROBLEMS, HOW DIFFICULT HAVE THESE PROBLEMS MADE IT FOR YOU TO DO YOUR WORK, TAKE CARE OF THINGS AT HOME, OR GET ALONG WITH OTHER PEOPLE: SOMEWHAT DIFFICULT
5. POOR APPETITE OR OVEREATING: 1
3. TROUBLE FALLING OR STAYING ASLEEP OR SLEEPING TOO MUCH: 1
5. POOR APPETITE OR OVEREATING: SEVERAL DAYS
SUM OF ALL RESPONSES TO PHQ QUESTIONS 1-9: 12
3. TROUBLE FALLING OR STAYING ASLEEP OR SLEEPING TOO MUCH: SEVERAL DAYS
1. LITTLE INTEREST OR PLEASURE IN DOING THINGS: 1
9. THOUGHTS THAT YOU WOULD BE BETTER OFF DEAD, OR OF HURTING YOURSELF: NOT AT ALL
5. POOR APPETITE OR OVEREATING: 1 - SEVERAL DAYS
7. TROUBLE CONCENTRATING ON THINGS, SUCH AS READING THE NEWSPAPER OR WATCHING TELEVISION: MORE THAN HALF THE DAYS
9. THOUGHTS THAT YOU WOULD BE BETTER OFF DEAD, OR OF HURTING YOURSELF: 0
8. MOVING OR SPEAKING SO SLOWLY THAT OTHER PEOPLE COULD HAVE NOTICED. OR THE OPPOSITE, BEING SO FIGETY OR RESTLESS THAT YOU HAVE BEEN MOVING AROUND A LOT MORE THAN USUAL: 2
4. FEELING TIRED OR HAVING LITTLE ENERGY: MORE THAN HALF THE DAYS
1. LITTLE INTEREST OR PLEASURE IN DOING THINGS: SEVERAL DAYS
7. TROUBLE CONCENTRATING ON THINGS, SUCH AS READING THE NEWSPAPER OR WATCHING TELEVISION: 2
8. MOVING OR SPEAKING SO SLOWLY THAT OTHER PEOPLE COULD HAVE NOTICED. OR THE OPPOSITE, BEING SO FIGETY OR RESTLESS THAT YOU HAVE BEEN MOVING AROUND A LOT MORE THAN USUAL: MORE THAN HALF THE DAYS
4. FEELING TIRED OR HAVING LITTLE ENERGY: 2
6. FEELING BAD ABOUT YOURSELF - OR THAT YOU ARE A FAILURE OR HAVE LET YOURSELF OR YOUR FAMILY DOWN: MORE THAN HALF THE DAYS
2. FEELING DOWN, DEPRESSED, IRRITABLE, OR HOPELESS: SEVERAL DAYS

## 2025-03-26 NOTE — LETTER
March 26, 2025            Regarding: Giles Botello    To Whom it May Concern:    Giles Botello is a patient of mine.  I am a psychiatrist.  He was not able to participate in activities during the fall of 2024 due to issues related to his mental health.      Sincerely,          Kelly Lopez M.D.

## 2025-03-26 NOTE — PROGRESS NOTES
[facility] AT [facility]  PSYCHIATRIC FOLLOW-UP NOTE    This evaluation was conducted via [employer] Teams, using secure and encrypted videoconferencing technology. The patient was physically located at their home address in [place], [place], and the physician was located at her home office in [place], [place]. The patient was presented by self. The patient’s identity was confirmed and verbal consent for the telemedicine encounter was obtained.    CC:  Presents for follow up visit for medication evaluation and management      History Of Present Illness:  Giles Botello is a 24 y.o. male, currently on house arrest for charges stemming from Sept 2024, see HPI 1/30/25 with history of Bipolar Affective DO, NOS, ANIBAL, r/o ADHD, r/o OCD, referred by his PCP, presents today for follow up.    The patient reported the following:  The Caplyta 42 mg is working really well to keep his mood even.  He feels calm.  He likes how it helps him feel calm but he feels like himself.  He likes it much better than the Abilify.  He did have significant SE when he first started taking it, nausea and body aches but that has resolved except for occasional flash of nausea, usually at least once a day.  He had to drop out of his Fall semester due to being arrested and being on house arrest.  The zoloft was making him feel more depressed and he ran out for a couple of days and felt better and when he restarted, his mood worsened.  So he discontinued it.  His ADHD symptoms are a lower priority right now, and he wants to make sure his mood stays stable first. He sees his therapist every other week.      ROS: As noted above in HPI.      History 1/30/25: He started on medications following an arrest during a sting operation, for solicitation of a minor (online via chat) and other charges related to this, investigator posing as a minor, age 13, and the patient states he cut off communication when he asked the person to  reiterate her age and she said 13.  He was incarcerated for this for a week and then for another 2 months and is now on house arrest.  He is working with a therapist, working diagnosis Bipolar Affective DO, unspecified, but it was questioned if it was I and not II.  His father and sister are diagnosed with Bipolar II and on a mood stabilizer, isn't sure which one, and his sister is on Zoloft.      DSM 5 Bipolar Disorder Type I Screening:    For a diagnosis of bipolar I disorder, it is necessary to meet the following criteria for a  manic episode. The manic episode may have been preceded by and may be followed by  hypomanic or major depressive episodes.    Manic Episode:  A distinct period of abnormally and persistently elevated, expansive, or irritable mood  and abnormally and persistently increased goal-directed activity or energy, lasting  at least 1 week and present most of the day, nearly every day (or any duration if  hospitalization is necessary).  The patient endorses.    During the period of mood disturbance and increased energy or activity, 3 (or more)  of the following symptoms (4 if the mood is only irritable) are present to a significant  degree and represent a noticeable change from usual behavior:    Inflated self-esteem or grandiosity.  The patient denies inflated self-esteem but endorses grandiosity at times where he believes he should be able to achieve something without putting in the work.    2. Decreased need for sleep (e.g., feels rested after only 3 hours of sleep).  The patient denies any change.  He is morning person, goes to bed at the same time between 9 pm to 11 pm, but if he wakes up early, he will not be able to go back to sleep, even if he only got 2 hours and this is regardless of his mood state.  His energy always feels the same.    3. More talkative than usual or pressure to keep talking.  The patient endorses this when his mood is higher.    4. Flight of ideas or subjective  "experience that thoughts are racing.  The patient reports that this is definitely worse during his \"high\" moods but also has this on a regular basis and may be due to his ADHD, r/o ANIBAL.  He experiences this while playing video games, ex. One that involves building things, I believe.  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     5. Distractibility (i.e., attention too easily drawn to unimportant or irrelevant  external stimuli), as reported or observed.  The patient reports that this may be worse during \"high\" mood periods.    6. Increase in goal-directed activity (either socially, at work or school, or sexually) or  psychomotor agitation (i.e., purposeless, non-goal-directed activity).  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  He has a lot of energy but isn't motivated to do things right now. Also when his mood is elevated he is more interested in learning and will become \"hyper motivated\", ex. It will last for 3 days and then he will lose motivation again.  It is unclear whether this is a hypomanic/manic symptom or an ADHD hyper focus on something with novelty/interest and then becoming disinterested when the novelty wears off or his interests change, ex. Being interested in developing in the area of virtual reality.       7.  Excessive involvement in activities that have a high potential for painful  consequences (e.g., engaging in unrestrained buying sprees, sexual indiscretions,  or foolish business investments).  The patient endorses that he did this once when he was exploring/experiencing/learning about having sex, had many partners in a 2 month period, unprotected sex, this was 2 years ago.    The mood disturbance is sufficiently severe to cause " marked impairment in social or  occupational functioning, or to necessitate hospitalization to prevent harm to self or  others, or there are psychotic features.  The patient states he has never been hospitalized for it and that the depressive episodes with low motivation definitely impact his school performance, ex. He won't go to class and then will plan to make up the work but then he doesn't do so and gets further behind.  A few times he has had auditory hallucinations, he things, but it wasn't consistent and has happened fewer than 5 times. He has not had any periods of delusional thinking.    The episode is not attributable to the physiological effects of a substance (e.g., a drug  of abuse, a medication, or other treatment) or to another medical condition.  Note: A full manic episode that emerges during antidepressant treatment [e.g., medication,  electroconvulsive therapy (ECT)], but persists at a fully syndromal level beyond the physiological effect of  treatment is sufficient evidence for a manic episode, and therefore, a bipolar I diagnosis.    END OF SCREENING FOR DSM 5 BIPOLAR Disorder Type I.    ADHD: He was a great student and school came easy for him until .  He did his HW, made good grades and then in  he isn't sure what happened except loss of motivation and possibly depression and possibly ADHD.  His parents told him that he was diagnosed with ADHD approx age 3 but that they did not want to put him on medication.     Mood:  yesterday his mood was a 10/10 and today it is a 6/10, 10 being a great mood.       Past Psychiatric History:  Denies any hospitalizations  Denies any history of SI, SA or self harm  He is working with a therapist  Medication trials:  Zoloft helps with anxiety and depression at 25 mg but felt more anxious at 50 mg, Abilify - helped at 2 mg with motivation and reduced his energy, more anxious at 5 mg and then very anxious at 10 mg and not able to tolerate      Family  "Psychiatric History:  Father and sister with Bipolar DO II and on mood stabilizers    Substance Use/Addiction History:  Alcohol:  used to drink occasionally up to 4 shots but stopped when he started mental health medications  Cannabis:  denies  Tobacco:  denies  Caffeine:  denies  Other:  Denies any other substances    Social History:  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   He has a GF, in a monogamous relationship x 8 months; he denies any hx of A/T.  He has an older sister.  His father worked as a clinical  and his mother is a teacher. The patient worked previously at his college at a  for the [employer].  Hobbies/interests: \"a lot\"; skiing, wake boarding, golf, video roopa, working on computer science projects.      Allergies:  Patient has no known allergies.      Physical Examination and Mental Status Exam:  Vital signs: There were no vitals taken for this visit.    CONSTITUTIONAL:  General Appearance:  Clean, casual attire, good eye contact, engaged with provider    ORIENTATION:  Oriented to time, place and person  RECENT AND REMOTE MEMORY:  Grossly intact  ATTENTION SPAN AND CONCENTRATION:  within normal range  LANGUAGE:  no deficits appreciated  FUND OF KNOWLEDGE:  has awareness of current events, past history and normal vocabulary  SPEECH:  normal volume, amount, rate and articulation, no perseveration or paucity of language  MOOD:  \"Calm\"   AFFECT: Full range  THOUGHT PROCESS:  logical and goal directed  THOUGHT CONTENT:  Denies any SI/HI or AVH, no delusional thinking nor preoccupations appreciated  ASSOCIATIONS:  Intact, not loose, no tangentiality or circumstantiality  MEMORY:  No gross evidence of memory deficits  JUDGMENT:  adequate concerning everyday activities  INSIGHT:  adequate to psychiatric condition    DIAGNOSTIC IMPRESSION:  1. Bipolar affective disorder, current episode depressed, current episode severity unspecified (HCC)  - VITAMIN B12; Future  - " "VITAMIN D,25 HYDROXY (DEFICIENCY); Future  - TSH; Future  - FREE THYROXINE; Future  - Lumateperone Tosylate (CAPLYTA) 42 MG Cap; Take 42 mg by mouth every day.  Dispense: 90 Capsule; Refill: 1       Assessment and Plan:  The patient's risk of suicide is assessed as low.  1.  Bipolar Affective DO, most recent episode Mixed, improving   Concentration Deficit, no change  R/o OCD  R/o ADHD  Self d/c'd  Zoloft 25 mg - made him feel more depressed, will be mindful that 50 mg caused him to feel more anxious  Self d/c'd Abilify 10 mg b/c he made his anxiety much worse but it helped with his motivation  Continue Caplyta 42 mg for Bipolar Affective DO  Initial visit and added to them today, has not yet obtained, encouraged the patient to obtain them soon: Ordered baseline lab work, 1/30/25 baseline weight is approx 195 lbs prior to starting Caplyta, although he did take Abilify for a period of time recently and he is 6'5\"  Added lab work - B12, D, thyroid fx  Initial visit 1/30/25: Referral for Neuropsych testing to tease out diagnosis/es further  1/30/25: Sent the patient two ADHD screenings, ARDS - adulthood and Wender - childhood  Reviewed prior visit HPI, histories and treatment plan in preparation for today's visit  Reviewed [place] PDMP      2.  The patient has a safety plan which included the [employer] crisis text and phone line and going to the nearest ED if symptoms worsen.    3.  Risks, benefits, alternatives and side effects were discussed for all medicines prescribed at this visit.  The patient voiced understanding providing informed consent.  The patient agrees to call the clinic with any questions or concerns, or seek emergent medical care if warranted.    4.  Follow up in 12 weeks or call sooner PRN    The proposed treatment plan was discussed with the patient who was provided the opportunity to ask questions and make suggestions regarding alternative treatment. Patient verbalized understanding and expressed agreement " with the plan.     Greater than 16 minutes of the visit was spent in psychotherapy.     Psychotherapy include:  Supportive psychotherapy and psychoeducation, topics: mood being more even, missing ski season and having to drop out of the fall semester in school - put him a year behind now will grad Spring 2026 instead of 2025.  Wrote letter on his behalf.  Review of PHQ9 and GAD7.        Kelly Lopez M.D.      This note was created using voice recognition software (Dragon). The accuracy of the dictation is limited by the abilities of the software. I have reviewed the note prior to signing, however some errors in grammar and context are still possible. If you have any questions related to this note please do not hesitate to contact our office.

## 2025-05-29 ENCOUNTER — TELEMEDICINE (OUTPATIENT)
Dept: BEHAVIORAL HEALTH | Facility: CLINIC | Age: 25
End: 2025-05-29

## 2025-05-29 DIAGNOSIS — F31.30 BIPOLAR AFFECTIVE DISORDER, CURRENT EPISODE DEPRESSED, CURRENT EPISODE SEVERITY UNSPECIFIED (HCC): ICD-10-CM

## 2025-05-29 DIAGNOSIS — F31.4 BIPOLAR DISORDER, CURRENT EPISODE DEPRESSED, SEVERE, WITHOUT PSYCHOTIC FEATURES (HCC): Primary | ICD-10-CM

## 2025-05-29 DIAGNOSIS — R41.840 CONCENTRATION DEFICIT: ICD-10-CM

## 2025-05-29 DIAGNOSIS — F31.60 BIPOLAR 1 DISORDER, MIXED (HCC): ICD-10-CM

## 2025-05-29 RX ORDER — LAMOTRIGINE 100 MG/1
TABLET ORAL
Qty: 194 TABLET | Refills: 1 | Status: SHIPPED | OUTPATIENT
Start: 2025-05-29 | End: 2025-09-10

## 2025-05-29 RX ORDER — LUMATEPERONE 42 MG/1
42 CAPSULE ORAL DAILY
Qty: 90 CAPSULE | Refills: 1 | Status: SHIPPED | OUTPATIENT
Start: 2025-05-29

## 2025-05-29 RX ORDER — LAMOTRIGINE 25 MG/1
TABLET ORAL
Qty: 42 TABLET | Refills: 0 | Status: SHIPPED | OUTPATIENT
Start: 2025-05-29 | End: 2025-06-26

## 2025-05-29 ASSESSMENT — ANXIETY QUESTIONNAIRES
2. NOT BEING ABLE TO STOP OR CONTROL WORRYING: SEVERAL DAYS
4. TROUBLE RELAXING: SEVERAL DAYS
2. NOT BEING ABLE TO STOP OR CONTROL WORRYING: SEVERAL DAYS
7. FEELING AFRAID AS IF SOMETHING AWFUL MIGHT HAPPEN: NEARLY EVERY DAY
3. WORRYING TOO MUCH ABOUT DIFFERENT THINGS: SEVERAL DAYS
IF YOU CHECKED OFF ANY PROBLEMS ON THIS QUESTIONNAIRE, HOW DIFFICULT HAVE THESE PROBLEMS MADE IT FOR YOU TO DO YOUR WORK, TAKE CARE OF THINGS AT HOME, OR GET ALONG WITH OTHER PEOPLE: SOMEWHAT DIFFICULT
5. BEING SO RESTLESS THAT IT IS HARD TO SIT STILL: MORE THAN HALF THE DAYS
1. FEELING NERVOUS, ANXIOUS, OR ON EDGE: MORE THAN HALF THE DAYS
IF YOU CHECKED OFF ANY PROBLEMS ON THIS QUESTIONNAIRE, HOW DIFFICULT HAVE THESE PROBLEMS MADE IT FOR YOU TO DO YOUR WORK, TAKE CARE OF THINGS AT HOME, OR GET ALONG WITH OTHER PEOPLE: SOMEWHAT DIFFICULT
7. FEELING AFRAID AS IF SOMETHING AWFUL MIGHT HAPPEN: NEARLY EVERY DAY
6. BECOMING EASILY ANNOYED OR IRRITABLE: SEVERAL DAYS
1. FEELING NERVOUS, ANXIOUS, OR ON EDGE: MORE THAN HALF THE DAYS
6. BECOMING EASILY ANNOYED OR IRRITABLE: SEVERAL DAYS
5. BEING SO RESTLESS THAT IT IS HARD TO SIT STILL: MORE THAN HALF THE DAYS
3. WORRYING TOO MUCH ABOUT DIFFERENT THINGS: SEVERAL DAYS
GAD7 TOTAL SCORE: 11
4. TROUBLE RELAXING: SEVERAL DAYS

## 2025-05-29 ASSESSMENT — PATIENT HEALTH QUESTIONNAIRE - PHQ9
1. LITTLE INTEREST OR PLEASURE IN DOING THINGS: MORE THAN HALF THE DAYS
5. POOR APPETITE OR OVEREATING: NEARLY EVERY DAY
3. TROUBLE FALLING OR STAYING ASLEEP OR SLEEPING TOO MUCH: 1
5. POOR APPETITE OR OVEREATING: 3
4. FEELING TIRED OR HAVING LITTLE ENERGY: 2
CLINICAL INTERPRETATION OF PHQ2 SCORE: 0
10. IF YOU CHECKED OFF ANY PROBLEMS, HOW DIFFICULT HAVE THESE PROBLEMS MADE IT FOR YOU TO DO YOUR WORK, TAKE CARE OF THINGS AT HOME, OR GET ALONG WITH OTHER PEOPLE: VERY DIFFICULT
7. TROUBLE CONCENTRATING ON THINGS, SUCH AS READING THE NEWSPAPER OR WATCHING TELEVISION: 1
1. LITTLE INTEREST OR PLEASURE IN DOING THINGS: 2
9. THOUGHTS THAT YOU WOULD BE BETTER OFF DEAD, OR OF HURTING YOURSELF: SEVERAL DAYS
6. FEELING BAD ABOUT YOURSELF - OR THAT YOU ARE A FAILURE OR HAVE LET YOURSELF OR YOUR FAMILY DOWN: 3
2. FEELING DOWN, DEPRESSED, IRRITABLE, OR HOPELESS: NEARLY EVERY DAY
2. FEELING DOWN, DEPRESSED, IRRITABLE, OR HOPELESS: 3
4. FEELING TIRED OR HAVING LITTLE ENERGY: MORE THAN HALF THE DAYS
3. TROUBLE FALLING OR STAYING ASLEEP OR SLEEPING TOO MUCH: SEVERAL DAYS
6. FEELING BAD ABOUT YOURSELF - OR THAT YOU ARE A FAILURE OR HAVE LET YOURSELF OR YOUR FAMILY DOWN: NEARLY EVERY DAY
8. MOVING OR SPEAKING SO SLOWLY THAT OTHER PEOPLE COULD HAVE NOTICED. OR THE OPPOSITE, BEING SO FIGETY OR RESTLESS THAT YOU HAVE BEEN MOVING AROUND A LOT MORE THAN USUAL: NOT AT ALL
7. TROUBLE CONCENTRATING ON THINGS, SUCH AS READING THE NEWSPAPER OR WATCHING TELEVISION: SEVERAL DAYS
8. MOVING OR SPEAKING SO SLOWLY THAT OTHER PEOPLE COULD HAVE NOTICED. OR THE OPPOSITE, BEING SO FIGETY OR RESTLESS THAT YOU HAVE BEEN MOVING AROUND A LOT MORE THAN USUAL: 0
SUM OF ALL RESPONSES TO PHQ QUESTIONS 1-9: 16
9. THOUGHTS THAT YOU WOULD BE BETTER OFF DEAD, OR OF HURTING YOURSELF: 1
5. POOR APPETITE OR OVEREATING: 3 - NEARLY EVERY DAY

## 2025-05-29 NOTE — PROGRESS NOTES
"[facility] AT [facility]  PSYCHIATRIC FOLLOW-UP NOTE    This evaluation was conducted via Microsoft Teams, using secure and encrypted videoconferencing technology. The patient was physically located at their home address in [place], [place], and the physician was located at her home office in [place], [place]. The patient was presented by self. The patient’s identity was confirmed and verbal consent for the telemedicine encounter was obtained.    CC:  Presents for follow up visit for medication evaluation and management      History Of Present Illness:  Giles Botello is a 24 y.o. male, currently on house arrest for charges stemming from Sept 2024, see HPI 1/30/25 with history of Bipolar Affective DO, NOS, ANIBAL, r/o ADHD, r/o OCD, referred by his PCP, presents today for follow up.    The patient reported the following:  The Caplyta 42 mg is working well to help stabilize his mood.  However, he is feeling very depressed right now and is struggling. He is working with his therapist and this helps.  However, the  restricted his activities even further - he is not allowed to go to the gym or get on the internet. \"It's like solitary confinement basically.\"  His room is small and he has a small hallway and so hard to workout at home.  He isn't able to look for an apartment even though his lease is coming due.      ROS: As noted above in HPI.      History 1/30/25: He started on medications following an arrest during a sting operation, for solicitation of a minor (online via chat) and other charges related to this, investigator posing as a minor, age 13, and the patient states he cut off communication when he asked the person to reiterate her age and she said 13.  He was incarcerated for this for a week and then for another 2 months and is now on house arrest.  He is working with a therapist, working diagnosis Bipolar Affective DO, unspecified, but it was questioned if it was I and not II.  " "His father and sister are diagnosed with Bipolar II and on a mood stabilizer, isn't sure which one, and his sister is on Zoloft.      DSM 5 Bipolar Disorder Type I Screening:    For a diagnosis of bipolar I disorder, it is necessary to meet the following criteria for a  manic episode. The manic episode may have been preceded by and may be followed by  hypomanic or major depressive episodes.    Manic Episode:  A distinct period of abnormally and persistently elevated, expansive, or irritable mood  and abnormally and persistently increased goal-directed activity or energy, lasting  at least 1 week and present most of the day, nearly every day (or any duration if  hospitalization is necessary).  The patient endorses.    During the period of mood disturbance and increased energy or activity, 3 (or more)  of the following symptoms (4 if the mood is only irritable) are present to a significant  degree and represent a noticeable change from usual behavior:    Inflated self-esteem or grandiosity.  The patient denies inflated self-esteem but endorses grandiosity at times where he believes he should be able to achieve something without putting in the work.    2. Decreased need for sleep (e.g., feels rested after only 3 hours of sleep).  The patient denies any change.  He is morning person, goes to bed at the same time between 9 pm to 11 pm, but if he wakes up early, he will not be able to go back to sleep, even if he only got 2 hours and this is regardless of his mood state.  His energy always feels the same.    3. More talkative than usual or pressure to keep talking.  The patient endorses this when his mood is higher.    4. Flight of ideas or subjective experience that thoughts are racing.  The patient reports that this is definitely worse during his \"high\" moods but also has this on a regular basis and may be due to his ADHD, r/o ANIBAL.  He experiences this while playing video games, ex. One that involves building things, I " "believe.  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     5. Distractibility (i.e., attention too easily drawn to unimportant or irrelevant  external stimuli), as reported or observed.  The patient reports that this may be worse during \"high\" mood periods.    6. Increase in goal-directed activity (either socially, at work or school, or sexually) or  psychomotor agitation (i.e., purposeless, non-goal-directed activity).  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  He has a lot of energy but isn't motivated to do things right now. Also when his mood is elevated he is more interested in learning and will become \"hyper motivated\", ex. It will last for 3 days and then he will lose motivation again.  It is unclear whether this is a hypomanic/manic symptom or an ADHD hyper focus on something with novelty/interest and then becoming disinterested when the novelty wears off or his interests change, ex. Being interested in developing in the area of virtual reality.       7.  Excessive involvement in activities that have a high potential for painful  consequences (e.g., engaging in unrestrained buying sprees, sexual indiscretions,  or foolish business investments).  The patient endorses that he did this once when he was exploring/experiencing/learning about having sex, had many partners in a 2 month period, unprotected sex, this was 2 years ago.    The mood disturbance is sufficiently severe to cause marked impairment in social or  occupational functioning, or to necessitate hospitalization to prevent harm to self or  others, or there are psychotic features.  The patient states he has never been hospitalized for it and that the depressive episodes with low motivation " definitely impact his school performance, ex. He won't go to class and then will plan to make up the work but then he doesn't do so and gets further behind.  A few times he has had auditory hallucinations, he things, but it wasn't consistent and has happened fewer than 5 times. He has not had any periods of delusional thinking.    The episode is not attributable to the physiological effects of a substance (e.g., a drug  of abuse, a medication, or other treatment) or to another medical condition.  Note: A full manic episode that emerges during antidepressant treatment [e.g., medication,  electroconvulsive therapy (ECT)], but persists at a fully syndromal level beyond the physiological effect of  treatment is sufficient evidence for a manic episode, and therefore, a bipolar I diagnosis.    END OF SCREENING FOR DSM 5 BIPOLAR Disorder Type I.    ADHD: He was a great student and school came easy for him until .  He did his HW, made good grades and then in HS he isn't sure what happened except loss of motivation and possibly depression and possibly ADHD.  His parents told him that he was diagnosed with ADHD approx age 3 but that they did not want to put him on medication.     Mood:  yesterday his mood was a 10/10 and today it is a 6/10, 10 being a great mood.       Past Psychiatric History:  Denies any hospitalizations  Denies any history of SI, SA or self harm  He is working with a therapist  Medication trials:  Zoloft helps with anxiety and depression at 25 mg but felt more anxious at 50 mg, Abilify - helped at 2 mg with motivation and reduced his energy, more anxious at 5 mg and then very anxious at 10 mg and not able to tolerate      Family Psychiatric History:  Father and sister with Bipolar DO II and on mood stabilizers, father possibly taking Lamictal    Substance Use/Addiction History:  Alcohol:  used to drink occasionally up to 4 shots but stopped when he started mental health medications  Cannabis:   "denies  Tobacco:  denies  Caffeine:  denies  Other:  Denies any other substances    Social History:  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   He has a GF, in a monogamous relationship x 8 months; he denies any hx of A/T.  He has an older sister.  His father worked as a clinical  and his mother is a teacher. The patient worked previously at his college at a  for the [employer].  Hobbies/interests: \"a lot\"; skiing, wake boarding, golf, video roopa, working on computer science projects.      Allergies:  Patient has no known allergies.      Physical Examination and Mental Status Exam:  Vital signs: There were no vitals taken for this visit.    CONSTITUTIONAL:  General Appearance:  Clean, casual attire, good eye contact, engaged with provider    ORIENTATION:  Oriented to time, place and person  RECENT AND REMOTE MEMORY:  Grossly intact  ATTENTION SPAN AND CONCENTRATION:  within normal range  LANGUAGE:  no deficits appreciated  FUND OF KNOWLEDGE:  has awareness of current events, past history and normal vocabulary  SPEECH:  normal volume, amount, rate and articulation, no perseveration or paucity of language  MOOD:  \"Depressed\"  AFFECT: Constricted, appears down, mood congruent  THOUGHT PROCESS:  logical and goal directed  THOUGHT CONTENT:  Denies any SI/HI or AVH, no delusional thinking nor preoccupations appreciated  ASSOCIATIONS:  Intact, not loose, no tangentiality or circumstantiality  MEMORY:  No gross evidence of memory deficits  JUDGMENT:  adequate concerning everyday activities  INSIGHT:  adequate to psychiatric condition    DIAGNOSTIC IMPRESSION:  1. Bipolar disorder, current episode depressed, severe, without psychotic features ([facility])  - lamoTRIgine (LAMICTAL) 25 MG Tab; Take 1 Tablet by mouth every day for 14 days, THEN 2 Tablets every day for 14 days. Then discontinue this prescription and begin prescription for 100 mg of Lamictal  Dispense: 42 Tablet; Refill: 0  - " "lamoTRIgine (LAMICTAL) 100 MG Tab; Take 1 Tablet by mouth every day for 14 days, THEN 1 Tablet 2 times a day for 90 days. Take 1 tablet by mouth once a day for 14 days, then take 2 tablets by mouth once a day  Dispense: 194 Tablet; Refill: 1  - Lumateperone Tosylate (CAPLYTA) 42 MG Cap; Take 42 mg by mouth every day.  Dispense: 90 Capsule; Refill: 1    2. Bipolar affective disorder, current episode depressed, current episode severity unspecified (HCC)    3. Bipolar 1 disorder, mixed (HCC)    4. Concentration deficit       Assessment and Plan:  The patient's risk of suicide is assessed as low.  1.  Bipolar Affective DO, current episode MDD severe, worsening  Concentration Deficit, no change  R/o OCD  R/o ADHD  Self d/c'd  Zoloft 25 mg - made him feel more depressed, will be mindful that 50 mg caused him to feel more anxious  Self d/c'd Abilify 10 mg b/c he made his anxiety much worse but it helped with his motivation  Continue Caplyta 42 mg for Bipolar Affective DO  Begin Lamictal 25 mg and titrate to 200 mg, reviewed potential SE of life-threatening rash, Omar Jaeger's, and how it appears and to d/c medication right away and to contact me, his PCP and/or go to urgent care or ER.   Has not yet obtained: Ordered baseline lab work, 1/30/25 baseline weight is approx 195 lbs prior to starting Caplyta, although he did take Abilify for a period of time recently and he is 6'5\"  Continue in individual therapy  Continue advocating for himself - he is working with his  to get his restrictions reduced, ex. So that he can look for another apartment  Initial visit 1/30/25: Referral for Neuropsych testing to tease out diagnosis/es further  1/30/25: Sent the patient two ADHD screenings, ARDS - adulthood and Wender - childhood  Reviewed prior visit HPI, histories and treatment plan in preparation for today's visit      2.  The patient has a safety plan which included the 555 crisis text and phone line and going to the " [facility] ED if symptoms worsen, and we reviewed this plan again today, he is awaiting a non-smart phone, no phone currently    3.  Risks, benefits, alternatives and side effects were discussed for all medicines prescribed at this visit.  The patient voiced understanding providing informed consent.  The patient agrees to call the clinic with any questions or concerns, or seek emergent medical care if warranted.    4.  Follow up in 4 to 6 weeks or call sooner PRN    The proposed treatment plan was discussed with the patient who was provided the opportunity to ask questions and make suggestions regarding alternative treatment. Patient verbalized understanding and expressed agreement with the plan.         Kelly Lopez M.D.      This note was created using voice recognition software (Dragon). The accuracy of the dictation is limited by the abilities of the software. I have reviewed the note prior to signing, however some errors in grammar and context are still possible. If you have any questions related to this note please do not hesitate to contact our office.

## 2025-06-27 ENCOUNTER — APPOINTMENT (OUTPATIENT)
Dept: BEHAVIORAL HEALTH | Facility: CLINIC | Age: 25
End: 2025-06-27

## 2025-07-03 DIAGNOSIS — F31.4 BIPOLAR DISORDER, CURRENT EPISODE DEPRESSED, SEVERE, WITHOUT PSYCHOTIC FEATURES (HCC): ICD-10-CM

## 2025-07-07 ENCOUNTER — PATIENT MESSAGE (OUTPATIENT)
Dept: BEHAVIORAL HEALTH | Facility: CLINIC | Age: 25
End: 2025-07-07

## 2025-07-07 DIAGNOSIS — F31.4 BIPOLAR DISORDER, CURRENT EPISODE DEPRESSED, SEVERE, WITHOUT PSYCHOTIC FEATURES (HCC): ICD-10-CM

## 2025-07-07 RX ORDER — LAMOTRIGINE 100 MG/1
TABLET ORAL
Qty: 180 TABLET | Refills: 1 | Status: SHIPPED | OUTPATIENT
Start: 2025-07-07 | End: 2025-10-19

## 2025-07-07 RX ORDER — LAMOTRIGINE 25 MG/1
TABLET ORAL
Qty: 42 TABLET | Refills: 0 | Status: SHIPPED | OUTPATIENT
Start: 2025-07-07

## 2025-07-07 NOTE — PATIENT COMMUNICATION
Good morning !  Pts is due for a refill but his pharmacy is [facility]. we resend the Lamotrigine 100mg  RX because the RX that was sent on 05/29/25 had different instructions on how to take the med. and they canceled it. Can you review the '' pt sig / qty/ day supply '' and edit if needed / resend so pt can  RX ? Thank you    Received request via: Pharmacy    Was the patient seen in the last year in this department? Yes    Does the patient have an active prescription (recently filled or refills available) for medication(s) requested? No    Pharmacy Name: [id number]    Does the patient have [facility] and need 100-day supply? (This applies to ALL medications) Patient does not have SCP

## 2025-08-01 ENCOUNTER — TELEMEDICINE (OUTPATIENT)
Dept: BEHAVIORAL HEALTH | Facility: CLINIC | Age: 25
End: 2025-08-01
Payer: MEDICAID

## 2025-08-01 DIAGNOSIS — F31.4 BIPOLAR DISORDER, CURRENT EPISODE DEPRESSED, SEVERE, WITHOUT PSYCHOTIC FEATURES (HCC): ICD-10-CM

## 2025-08-01 PROCEDURE — 90833 PSYTX W PT W E/M 30 MIN: CPT | Mod: 95 | Performed by: PSYCHIATRY & NEUROLOGY

## 2025-08-01 PROCEDURE — 99214 OFFICE O/P EST MOD 30 MIN: CPT | Mod: 95 | Performed by: PSYCHIATRY & NEUROLOGY

## 2025-08-01 RX ORDER — LAMOTRIGINE 200 MG/1
200 TABLET ORAL DAILY
Qty: 90 TABLET | Refills: 1 | Status: SHIPPED | OUTPATIENT
Start: 2025-08-01

## 2025-08-01 RX ORDER — LUMATEPERONE 42 MG/1
42 CAPSULE ORAL DAILY
Qty: 90 CAPSULE | Refills: 1 | Status: SHIPPED | OUTPATIENT
Start: 2025-08-01

## 2025-08-01 ASSESSMENT — PATIENT HEALTH QUESTIONNAIRE - PHQ9
5. POOR APPETITE OR OVEREATING: SEVERAL DAYS
9. THOUGHTS THAT YOU WOULD BE BETTER OFF DEAD, OR OF HURTING YOURSELF: 0
4. FEELING TIRED OR HAVING LITTLE ENERGY: 1
CLINICAL INTERPRETATION OF PHQ2 SCORE: 0
6. FEELING BAD ABOUT YOURSELF - OR THAT YOU ARE A FAILURE OR HAVE LET YOURSELF OR YOUR FAMILY DOWN: 1
5. POOR APPETITE OR OVEREATING: 1 - SEVERAL DAYS
SUM OF ALL RESPONSES TO PHQ QUESTIONS 1-9: 6
2. FEELING DOWN, DEPRESSED, IRRITABLE, OR HOPELESS: SEVERAL DAYS
3. TROUBLE FALLING OR STAYING ASLEEP OR SLEEPING TOO MUCH: 1
7. TROUBLE CONCENTRATING ON THINGS, SUCH AS READING THE NEWSPAPER OR WATCHING TELEVISION: SEVERAL DAYS
10. IF YOU CHECKED OFF ANY PROBLEMS, HOW DIFFICULT HAVE THESE PROBLEMS MADE IT FOR YOU TO DO YOUR WORK, TAKE CARE OF THINGS AT HOME, OR GET ALONG WITH OTHER PEOPLE: SOMEWHAT DIFFICULT
7. TROUBLE CONCENTRATING ON THINGS, SUCH AS READING THE NEWSPAPER OR WATCHING TELEVISION: 1
8. MOVING OR SPEAKING SO SLOWLY THAT OTHER PEOPLE COULD HAVE NOTICED. OR THE OPPOSITE, BEING SO FIGETY OR RESTLESS THAT YOU HAVE BEEN MOVING AROUND A LOT MORE THAN USUAL: NOT AT ALL
3. TROUBLE FALLING OR STAYING ASLEEP OR SLEEPING TOO MUCH: SEVERAL DAYS
1. LITTLE INTEREST OR PLEASURE IN DOING THINGS: 0
8. MOVING OR SPEAKING SO SLOWLY THAT OTHER PEOPLE COULD HAVE NOTICED. OR THE OPPOSITE, BEING SO FIGETY OR RESTLESS THAT YOU HAVE BEEN MOVING AROUND A LOT MORE THAN USUAL: 0
5. POOR APPETITE OR OVEREATING: 1
6. FEELING BAD ABOUT YOURSELF - OR THAT YOU ARE A FAILURE OR HAVE LET YOURSELF OR YOUR FAMILY DOWN: SEVERAL DAYS
9. THOUGHTS THAT YOU WOULD BE BETTER OFF DEAD, OR OF HURTING YOURSELF: NOT AT ALL
2. FEELING DOWN, DEPRESSED, IRRITABLE, OR HOPELESS: 1
1. LITTLE INTEREST OR PLEASURE IN DOING THINGS: NOT AT ALL
4. FEELING TIRED OR HAVING LITTLE ENERGY: SEVERAL DAYS

## 2025-08-01 ASSESSMENT — ANXIETY QUESTIONNAIRES
2. NOT BEING ABLE TO STOP OR CONTROL WORRYING: NOT AT ALL
4. TROUBLE RELAXING: SEVERAL DAYS
6. BECOMING EASILY ANNOYED OR IRRITABLE: NOT AT ALL
2. NOT BEING ABLE TO STOP OR CONTROL WORRYING: NOT AT ALL
7. FEELING AFRAID AS IF SOMETHING AWFUL MIGHT HAPPEN: SEVERAL DAYS
7. FEELING AFRAID AS IF SOMETHING AWFUL MIGHT HAPPEN: SEVERAL DAYS
1. FEELING NERVOUS, ANXIOUS, OR ON EDGE: SEVERAL DAYS
3. WORRYING TOO MUCH ABOUT DIFFERENT THINGS: SEVERAL DAYS
GAD7 TOTAL SCORE: 4
5. BEING SO RESTLESS THAT IT IS HARD TO SIT STILL: NOT AT ALL
4. TROUBLE RELAXING: SEVERAL DAYS
3. WORRYING TOO MUCH ABOUT DIFFERENT THINGS: SEVERAL DAYS
1. FEELING NERVOUS, ANXIOUS, OR ON EDGE: SEVERAL DAYS
6. BECOMING EASILY ANNOYED OR IRRITABLE: NOT AT ALL
5. BEING SO RESTLESS THAT IT IS HARD TO SIT STILL: NOT AT ALL